# Patient Record
Sex: MALE | Race: AMERICAN INDIAN OR ALASKA NATIVE | NOT HISPANIC OR LATINO | Employment: FULL TIME | URBAN - METROPOLITAN AREA
[De-identification: names, ages, dates, MRNs, and addresses within clinical notes are randomized per-mention and may not be internally consistent; named-entity substitution may affect disease eponyms.]

---

## 2022-12-12 ENCOUNTER — HOSPITAL ENCOUNTER (OUTPATIENT)
Dept: RADIOLOGY | Facility: HOSPITAL | Age: 42
Discharge: HOME | End: 2022-12-12
Payer: COMMERCIAL

## 2022-12-12 DIAGNOSIS — Z79.01 LONG TERM (CURRENT) USE OF ANTICOAGULANTS: ICD-10-CM

## 2022-12-12 DIAGNOSIS — J32.8 OTHER CHRONIC SINUSITIS: ICD-10-CM

## 2022-12-12 DIAGNOSIS — Z01.818 OTHER SPECIFIED PRE-OPERATIVE EXAMINATION: Primary | ICD-10-CM

## 2022-12-12 DIAGNOSIS — Z01.818 OTHER SPECIFIED PRE-OPERATIVE EXAMINATION: ICD-10-CM

## 2022-12-12 PROCEDURE — 71046 X-RAY EXAM CHEST 2 VIEWS: CPT | Mod: TC

## 2022-12-21 ENCOUNTER — ANESTHESIA EVENT (OUTPATIENT)
Dept: SURGERY | Facility: HOSPITAL | Age: 42
End: 2022-12-21
Payer: COMMERCIAL

## 2022-12-21 NOTE — DISCHARGE INSTRUCTIONS
Endoscopic Sinus Surgery Discharge Instructions     About this topic   Endoscopic sinus surgery can correct problems with your sinuses when drugs do not help. The sinuses are air-filled spaces inside the head that lie behind your forehead, nose, cheeks, and eyes. The spaces have small hairs that clean the sinuses. Your sinuses may swell, get inflamed or infected if the small hairs are not working well. You can also have problems if there is a block in the opening to the sinuses. The mucus sometimes gets trapped inside the sinus, causing pain.     What care is needed at home?   Do not blow your nose or sneeze for 7 to 10 days. If you must sneeze, keep your mouth open.  Change your dressing as needed for the next few days. Once the bleeding stops then you will not need a dressing anymore.   Breathe through your mouth for the first few days.  Place an ice pack wrapped in a towel over the painful part. Never put ice right on the skin. Do not leave the ice on more than 10 to 15 minutes at a time. This will help relieve pain and swelling.  Protect your nose from injury.  Avoid activities that put pressure on your face, like bending over or holding your breath.  You can take a bath or shower but make sure it's not too hot.   Ask the doctor when you should start to use a saline rinse for your nose. This may be right after any packing has been removed. You may need to use the rinse 3 to 4 times each day to help prevent crusts from forming inside of your nose.  You can use a humidifier.  Ask the doctor when you can return to your normal activities or return to work.    What follow-up care is needed?   Keep your scheduled follow up appointment.   Your doctor may remove any packing or stents (small tubes) at one of these visits. Follow the instructions your doctor told you.    Will physical activity be limited?   Talk with your doctor about the right amount of activity for you.  Do not lift anything over 10 pounds (4.5 kg) for  2 to 4 weeks.  Do not bend over toward the floor.  Avoid activities that may jerk your head, like playing sports.    What problems could happen?   Infection  Bleeding  Numbness in the nose  Eye problems  Bruising around eyes  Cerebrospinal fluid leaks from nose    When do I need to call the doctor?   Signs of infection. These include a fever of 100.4°F (38°C) or higher, chills.  Lots of bleeding from your nose  Packing comes out before it should  Upset stomach and throwing up not helped with drugs  Too much pain or headache  Swelling or redness of the eyes or nose  Clear fluid draining from one side of your nose  Cough, shortness of breath, chest pain  You are not feeling better in 2 to 3 days or you are feeling worse     Patient Education       Septoplasty Discharge Instructions   About this topic   Septoplasty is done to correct problems with the septum inside your nose. Your septum is the wall that divides your nostrils. It is made of bone and hard tissue called cartilage. The surgery is done to:  Fix a septum that is out of place and is blocking your nose  Straighten your septum  Repair a crooked, bent, or deformed septum that blocks your ability to breathe  Repair a hole in your septum  Control sinus infections  Control irregular nose bleeding     What care is needed at home?   Avoid lying face down for the first few days after the surgery.  Sleep in a recliner or in a reclined position for the first few nights.  Breathe through your mouth for the first few days.  Place an ice pack wrapped in a towel over the painful part. Never put ice right on the skin. Do not leave the ice on more than 10 to 15 minutes at a time. This will help relieve pain and swelling.  Do not blow, touch, or rub your nose. This can cause more swelling and bleeding. Try not to sneeze.  Protect your nose from injury.  Avoid activities that put pressure on your face, like bending over or holding your breath.  Wound care: Take the packing out  tomorrow.  You can start taking a shower tomorrow.  Ask the doctor when you can return to your normal activities or return to work.  What follow-up care is needed?   Be sure to keep the follow up visit.   Will physical activity be limited?   Talk with your doctor about the right amount of activity for you.  Do not lift anything heavy for 2 to 4 weeks.   Do not stay in the sun for more than 15 minutes. This will help to avoid getting sunburn on your nose.  What problems could happen?   Infection  Bleeding  Numbness in the nose  Hole in nasal septum  Scarring in nose that that causes blockage  Changes in taste or smell  When do I need to call the doctor?   Signs of infection. These include a fever of 100.4°F (38°C) or higher, chills.  Signs of wound infection. These include swelling, redness, warmth around the wound; too much pain when touched; yellowish, greenish, or bloody discharge; foul smell coming from the cut site; cut site opens up.  Lots of bleeding  Packing comes out before it should  Upset stomach and throwing up not helped with drugs  Too much pain  Cough, shortness of breath, chest pain  Health problem is not better or you are feeling worse

## 2022-12-22 ENCOUNTER — ANESTHESIA (OUTPATIENT)
Dept: SURGERY | Facility: HOSPITAL | Age: 42
End: 2022-12-22
Payer: COMMERCIAL

## 2022-12-22 ENCOUNTER — HOSPITAL ENCOUNTER (OUTPATIENT)
Facility: HOSPITAL | Age: 42
Discharge: HOME | End: 2022-12-22
Attending: OTOLARYNGOLOGY | Admitting: OTOLARYNGOLOGY
Payer: COMMERCIAL

## 2022-12-22 DIAGNOSIS — J32.8 OTHER CHRONIC SINUSITIS: ICD-10-CM

## 2022-12-22 PROCEDURE — 25000003 PHARM REV CODE 250: Performed by: ANESTHESIOLOGY

## 2022-12-22 PROCEDURE — 36000709 HC OR TIME LEV III EA ADD 15 MIN: Performed by: OTOLARYNGOLOGY

## 2022-12-22 PROCEDURE — 37000008 HC ANESTHESIA 1ST 15 MINUTES: Performed by: OTOLARYNGOLOGY

## 2022-12-22 PROCEDURE — 63600175 PHARM REV CODE 636 W HCPCS: Performed by: OTOLARYNGOLOGY

## 2022-12-22 PROCEDURE — C9046 COCAINE HCL NASAL SOLUTION: HCPCS | Mod: TB | Performed by: OTOLARYNGOLOGY

## 2022-12-22 PROCEDURE — 71000015 HC POSTOP RECOV 1ST HR: Performed by: OTOLARYNGOLOGY

## 2022-12-22 PROCEDURE — 63600175 PHARM REV CODE 636 W HCPCS: Performed by: ANESTHESIOLOGY

## 2022-12-22 PROCEDURE — 25000003 PHARM REV CODE 250

## 2022-12-22 PROCEDURE — 71000033 HC RECOVERY, INTIAL HOUR: Performed by: OTOLARYNGOLOGY

## 2022-12-22 PROCEDURE — 25000003 PHARM REV CODE 250: Performed by: NURSE ANESTHETIST, CERTIFIED REGISTERED

## 2022-12-22 PROCEDURE — 36000708 HC OR TIME LEV III 1ST 15 MIN: Performed by: OTOLARYNGOLOGY

## 2022-12-22 PROCEDURE — 25000003 PHARM REV CODE 250: Mod: TB | Performed by: OTOLARYNGOLOGY

## 2022-12-22 PROCEDURE — 37000009 HC ANESTHESIA EA ADD 15 MINS: Performed by: OTOLARYNGOLOGY

## 2022-12-22 PROCEDURE — 71000039 HC RECOVERY, EACH ADD'L HOUR: Performed by: OTOLARYNGOLOGY

## 2022-12-22 PROCEDURE — 63600175 PHARM REV CODE 636 W HCPCS: Performed by: NURSE ANESTHETIST, CERTIFIED REGISTERED

## 2022-12-22 PROCEDURE — 27201423 OPTIME MED/SURG SUP & DEVICES STERILE SUPPLY: Performed by: OTOLARYNGOLOGY

## 2022-12-22 PROCEDURE — 71000016 HC POSTOP RECOV ADDL HR: Performed by: OTOLARYNGOLOGY

## 2022-12-22 RX ORDER — SODIUM CHLORIDE 9 MG/ML
INJECTION, SOLUTION INTRAVENOUS CONTINUOUS
Status: DISCONTINUED | OUTPATIENT
Start: 2022-12-22 | End: 2022-12-22 | Stop reason: HOSPADM

## 2022-12-22 RX ORDER — ONDANSETRON 2 MG/ML
4 INJECTION INTRAMUSCULAR; INTRAVENOUS ONCE
Status: DISCONTINUED | OUTPATIENT
Start: 2022-12-22 | End: 2022-12-22 | Stop reason: HOSPADM

## 2022-12-22 RX ORDER — CEFAZOLIN SODIUM 1 G/3ML
2 INJECTION, POWDER, FOR SOLUTION INTRAMUSCULAR; INTRAVENOUS
Status: COMPLETED | OUTPATIENT
Start: 2022-12-22 | End: 2022-12-22

## 2022-12-22 RX ORDER — COCAINE HYDROCHLORIDE 40 MG/ML
SOLUTION NASAL
Status: DISCONTINUED
Start: 2022-12-22 | End: 2022-12-22 | Stop reason: HOSPADM

## 2022-12-22 RX ORDER — SILVER NITRATE 38.21; 12.74 MG/1; MG/1
STICK TOPICAL
Status: DISCONTINUED | OUTPATIENT
Start: 2022-12-22 | End: 2022-12-22 | Stop reason: HOSPADM

## 2022-12-22 RX ORDER — LIDOCAINE HYDROCHLORIDE 10 MG/ML
INJECTION, SOLUTION EPIDURAL; INFILTRATION; INTRACAUDAL; PERINEURAL
Status: DISCONTINUED | OUTPATIENT
Start: 2022-12-22 | End: 2022-12-22

## 2022-12-22 RX ORDER — LABETALOL HYDROCHLORIDE 5 MG/ML
INJECTION, SOLUTION INTRAVENOUS
Status: DISCONTINUED
Start: 2022-12-22 | End: 2022-12-22 | Stop reason: HOSPADM

## 2022-12-22 RX ORDER — OXYMETAZOLINE HCL 0.05 %
2 SPRAY, NON-AEROSOL (ML) NASAL
Status: COMPLETED | OUTPATIENT
Start: 2022-12-22 | End: 2022-12-22

## 2022-12-22 RX ORDER — METHYLPREDNISOLONE 4 MG/1
4 TABLET ORAL
COMMUNITY
End: 2023-08-14

## 2022-12-22 RX ORDER — COCAINE HYDROCHLORIDE 40 MG/ML
SOLUTION NASAL
Status: DISCONTINUED | OUTPATIENT
Start: 2022-12-22 | End: 2022-12-22 | Stop reason: HOSPADM

## 2022-12-22 RX ORDER — LIDOCAINE HYDROCHLORIDE 10 MG/ML
INJECTION, SOLUTION EPIDURAL; INFILTRATION; INTRACAUDAL; PERINEURAL
Status: DISCONTINUED | OUTPATIENT
Start: 2022-12-22 | End: 2022-12-22 | Stop reason: HOSPADM

## 2022-12-22 RX ORDER — MIDAZOLAM HYDROCHLORIDE 1 MG/ML
2 INJECTION INTRAMUSCULAR; INTRAVENOUS ONCE AS NEEDED
Status: COMPLETED | OUTPATIENT
Start: 2022-12-22 | End: 2022-12-22

## 2022-12-22 RX ORDER — OXYMETAZOLINE HCL 0.05 %
SPRAY, NON-AEROSOL (ML) NASAL
Status: COMPLETED
Start: 2022-12-22 | End: 2022-12-22

## 2022-12-22 RX ORDER — PROPOFOL 10 MG/ML
VIAL (ML) INTRAVENOUS
Status: DISCONTINUED | OUTPATIENT
Start: 2022-12-22 | End: 2022-12-22

## 2022-12-22 RX ORDER — FENTANYL CITRATE 50 UG/ML
INJECTION, SOLUTION INTRAMUSCULAR; INTRAVENOUS
Status: DISCONTINUED | OUTPATIENT
Start: 2022-12-22 | End: 2022-12-22

## 2022-12-22 RX ORDER — EPINEPHRINE CONVENIENCE KIT 1 MG/ML(1)
KIT INTRAMUSCULAR; SUBCUTANEOUS
Status: DISCONTINUED | OUTPATIENT
Start: 2022-12-22 | End: 2022-12-22 | Stop reason: HOSPADM

## 2022-12-22 RX ORDER — METHOCARBAMOL 100 MG/ML
1000 INJECTION, SOLUTION INTRAMUSCULAR; INTRAVENOUS ONCE
Status: COMPLETED | OUTPATIENT
Start: 2022-12-22 | End: 2022-12-22

## 2022-12-22 RX ORDER — LABETALOL HYDROCHLORIDE 5 MG/ML
10 INJECTION, SOLUTION INTRAVENOUS ONCE
Status: COMPLETED | OUTPATIENT
Start: 2022-12-22 | End: 2022-12-22

## 2022-12-22 RX ORDER — ONDANSETRON HYDROCHLORIDE 2 MG/ML
INJECTION, SOLUTION INTRAMUSCULAR; INTRAVENOUS
Status: DISCONTINUED | OUTPATIENT
Start: 2022-12-22 | End: 2022-12-22

## 2022-12-22 RX ORDER — CEFDINIR 300 MG/1
300 CAPSULE ORAL 2 TIMES DAILY
COMMUNITY
End: 2023-08-15

## 2022-12-22 RX ORDER — MEPERIDINE HYDROCHLORIDE 25 MG/ML
12.5 INJECTION INTRAMUSCULAR; INTRAVENOUS; SUBCUTANEOUS ONCE
Status: DISCONTINUED | OUTPATIENT
Start: 2022-12-22 | End: 2022-12-22 | Stop reason: HOSPADM

## 2022-12-22 RX ORDER — ROCURONIUM BROMIDE 10 MG/ML
INJECTION, SOLUTION INTRAVENOUS
Status: DISCONTINUED | OUTPATIENT
Start: 2022-12-22 | End: 2022-12-22

## 2022-12-22 RX ORDER — SODIUM CHLORIDE, SODIUM GLUCONATE, SODIUM ACETATE, POTASSIUM CHLORIDE AND MAGNESIUM CHLORIDE 30; 37; 368; 526; 502 MG/100ML; MG/100ML; MG/100ML; MG/100ML; MG/100ML
INJECTION, SOLUTION INTRAVENOUS CONTINUOUS
Status: DISCONTINUED | OUTPATIENT
Start: 2022-12-22 | End: 2022-12-22 | Stop reason: HOSPADM

## 2022-12-22 RX ORDER — HYDROMORPHONE HYDROCHLORIDE 2 MG/ML
INJECTION, SOLUTION INTRAMUSCULAR; INTRAVENOUS; SUBCUTANEOUS
Status: DISCONTINUED
Start: 2022-12-22 | End: 2022-12-22 | Stop reason: HOSPADM

## 2022-12-22 RX ORDER — HYDROMORPHONE HYDROCHLORIDE 2 MG/ML
0.4 INJECTION, SOLUTION INTRAMUSCULAR; INTRAVENOUS; SUBCUTANEOUS EVERY 5 MIN PRN
Status: DISCONTINUED | OUTPATIENT
Start: 2022-12-22 | End: 2022-12-22 | Stop reason: HOSPADM

## 2022-12-22 RX ORDER — SODIUM CHLORIDE, SODIUM LACTATE, POTASSIUM CHLORIDE, CALCIUM CHLORIDE 600; 310; 30; 20 MG/100ML; MG/100ML; MG/100ML; MG/100ML
INJECTION, SOLUTION INTRAVENOUS CONTINUOUS
Status: DISCONTINUED | OUTPATIENT
Start: 2022-12-22 | End: 2022-12-22 | Stop reason: HOSPADM

## 2022-12-22 RX ORDER — SILVER NITRATE 38.21; 12.74 MG/1; MG/1
STICK TOPICAL
Status: DISCONTINUED
Start: 2022-12-22 | End: 2022-12-22 | Stop reason: HOSPADM

## 2022-12-22 RX ORDER — EPINEPHRINE 1 MG/ML
INJECTION, SOLUTION, CONCENTRATE INTRAVENOUS
Status: DISCONTINUED
Start: 2022-12-22 | End: 2022-12-22 | Stop reason: HOSPADM

## 2022-12-22 RX ORDER — DEXAMETHASONE SODIUM PHOSPHATE 4 MG/ML
INJECTION, SOLUTION INTRA-ARTICULAR; INTRALESIONAL; INTRAMUSCULAR; INTRAVENOUS; SOFT TISSUE
Status: DISCONTINUED | OUTPATIENT
Start: 2022-12-22 | End: 2022-12-22

## 2022-12-22 RX ORDER — CEFAZOLIN 2 G/1
INJECTION, POWDER, FOR SOLUTION INTRAMUSCULAR; INTRAVENOUS
Status: COMPLETED
Start: 2022-12-22 | End: 2022-12-22

## 2022-12-22 RX ADMIN — HYDROMORPHONE HYDROCHLORIDE 0.4 MG: 2 INJECTION INTRAMUSCULAR; INTRAVENOUS; SUBCUTANEOUS at 12:12

## 2022-12-22 RX ADMIN — OXYMETAZOLINE HCL: 0.05 SPRAY NASAL at 10:12

## 2022-12-22 RX ADMIN — DEXAMETHASONE SODIUM PHOSPHATE 12 MG: 4 INJECTION, SOLUTION INTRA-ARTICULAR; INTRALESIONAL; INTRAMUSCULAR; INTRAVENOUS; SOFT TISSUE at 11:12

## 2022-12-22 RX ADMIN — FENTANYL CITRATE 100 MCG: 50 INJECTION, SOLUTION INTRAMUSCULAR; INTRAVENOUS at 11:12

## 2022-12-22 RX ADMIN — CEFAZOLIN 2 G: 330 INJECTION, POWDER, FOR SOLUTION INTRAMUSCULAR; INTRAVENOUS at 11:12

## 2022-12-22 RX ADMIN — LABETALOL HYDROCHLORIDE 10 MG: 5 INJECTION, SOLUTION INTRAVENOUS at 12:12

## 2022-12-22 RX ADMIN — ROCURONIUM BROMIDE 50 MG: 50 INJECTION INTRAVENOUS at 11:12

## 2022-12-22 RX ADMIN — ONDANSETRON 4 MG: 2 INJECTION INTRAMUSCULAR; INTRAVENOUS at 11:12

## 2022-12-22 RX ADMIN — LIDOCAINE HYDROCHLORIDE 50 MG: 10 INJECTION, SOLUTION EPIDURAL; INFILTRATION; INTRACAUDAL; PERINEURAL at 11:12

## 2022-12-22 RX ADMIN — METHOCARBAMOL 1000 MG: 100 INJECTION INTRAMUSCULAR; INTRAVENOUS at 12:12

## 2022-12-22 RX ADMIN — PROPOFOL 200 MG: 10 INJECTION, EMULSION INTRAVENOUS at 11:12

## 2022-12-22 RX ADMIN — SODIUM CHLORIDE, POTASSIUM CHLORIDE, SODIUM LACTATE AND CALCIUM CHLORIDE: 600; 310; 30; 20 INJECTION, SOLUTION INTRAVENOUS at 10:12

## 2022-12-22 RX ADMIN — MIDAZOLAM HYDROCHLORIDE 2 MG: 1 INJECTION, SOLUTION INTRAMUSCULAR; INTRAVENOUS at 11:12

## 2022-12-22 RX ADMIN — SUGAMMADEX 250 MG: 100 INJECTION, SOLUTION INTRAVENOUS at 11:12

## 2022-12-22 RX ADMIN — Medication 2 SPRAY: at 10:12

## 2022-12-22 RX ADMIN — Medication: at 10:12

## 2022-12-22 NOTE — ANESTHESIA PROCEDURE NOTES
Intubation    Date/Time: 12/22/2022 11:16 AM  Performed by: Zenia Marshall CRNA  Authorized by: Yuri Montilla MD     Intubation:     Induction:  Intravenous    Intubated:  Postinduction    Mask Ventilation:  Easy mask    Attempts:  1    Attempted By:  CRNA    Method of Intubation:  Direct    Blade:  Lopez 2    Laryngeal View Grade: Grade I - full view of cords      Difficult Airway Encountered?: No      Complications:  None    Airway Device:  Oral endotracheal tube    Airway Device Size:  7.5    Style/Cuff Inflation:  Cuffed (inflated to minimal occlusive pressure)    Tube secured:  23    Secured at:  The lips    Placement Verified By:  Capnometry    Complicating Factors:  None    Findings Post-Intubation:  BS equal bilateral and atraumatic/condition of teeth unchanged

## 2022-12-22 NOTE — TRANSFER OF CARE
"Anesthesia Transfer of Care Note    Patient: Jerome Loja    Procedure(s) Performed: Procedure(s) (LRB):  FESS, USING COMPUTER-ASSISTED NAVIGATION  Intraop CT image guidance (N/A)  EXCISION, NASAL TURBINATE, SUBMUCOSAL Bilateral (Bilateral)    Patient location: PACU    Anesthesia Type: general    Transport from OR: Transported from OR on room air with adequate spontaneous ventilation    Post pain: adequate analgesia    Post assessment: no apparent anesthetic complications    Post vital signs: stable    Level of consciousness: sedated    Nausea/Vomiting: no nausea/vomiting    Complications: none    Transfer of care protocol was followed      Last vitals:   Visit Vitals  /86   Pulse 94   Temp 36.2 °C (97.2 °F)   Resp 12   Ht 5' 10" (1.778 m)   Wt 63.5 kg (139 lb 15.9 oz)   SpO2 100%   BMI 20.09 kg/m²     "

## 2022-12-22 NOTE — ANESTHESIA PREPROCEDURE EVALUATION
"                                                                                                             12/22/2022  Jerome Loja is a 42 y.o., male   Pre-operative evaluation for Procedure(s) (LRB):  FESS, USING COMPUTER-ASSISTED NAVIGATION  Intraop CT image guidance (N/A)  SEPTOPLASTY, NOSE (N/A)  EXCISION, NASAL TURBINATE, SUBMUCOSAL Bilateral (Bilateral)    /78   Pulse 66   Temp 36.8 °C (98.3 °F) (Oral)   Ht 5' 10" (1.778 m)   Wt 63.5 kg (139 lb 15.9 oz)   SpO2 97%   BMI 20.09 kg/m²     Past Medical History:   Diagnosis Date    Chronic sinusitis, unspecified        There is no problem list on file for this patient.      Review of patient's allergies indicates:  No Known Allergies    Current Outpatient Medications   Medication Instructions    cefdinir (OMNICEF) 300 mg, Oral, 2 times daily    methylPREDNISolone (MEDROL DOSEPACK) 4 mg, Oral, use as directed       History reviewed. No pertinent surgical history.    Social History     Socioeconomic History    Marital status:    Tobacco Use    Smoking status: Never    Smokeless tobacco: Never   Substance and Sexual Activity    Alcohol use: Not Currently    Drug use: Never    Sexual activity: Yes     Partners: Female       Lab Results   Component Value Date    WBC 4.7 12/12/2022    HGB 13.1 (L) 12/12/2022    HCT 39.8 (L) 12/12/2022    MCV 95.2 (H) 12/12/2022     12/12/2022          BMP  Lab Results   Component Value Date     12/12/2022    K 4.3 12/12/2022    CHLORIDE 104 12/12/2022    CO2 31 (H) 12/12/2022    GLUCOSE 84 12/12/2022    BUN 10.1 12/12/2022    CREATININE 0.89 12/12/2022    CALCIUM 9.0 12/12/2022        INR  No results for input(s): PT, INR, PROTIME, APTT in the last 72 hours.        Diagnostic Studies:      EKG:  Results for orders placed or performed in visit on 12/12/22   EKG 12-lead    Collection Time: 12/12/22 11:25 AM    Narrative    Test Reason : Z01.818,J32.8,Z79.01,    Vent. Rate : 063 BPM     Atrial " Rate : 063 BPM     P-R Int : 136 ms          QRS Dur : 112 ms      QT Int : 408 ms       P-R-T Axes : 077 097 064 degrees     QTc Int : 417 ms    Normal sinus rhythm  Right bundle branch block  Abnormal ECG  No previous ECGs available  Confirmed by Stu Heller MD ([id number]) on 12/12/2022 4:40:02 PM    Referred By:             Confirmed By:Stu Heller MD       .      Pre-op Assessment    I have reviewed the Patient Summary Reports.    I have reviewed the NPO Status.   I have reviewed the Medications.     Review of Systems  Anesthesia Hx:  No problems with previous Anesthesia  Denies Family Hx of Anesthesia complications.    Cardiovascular:  Cardiovascular Normal  No Cardiac Complaints   Pulmonary:  Pulmonary Normal No Pulmonary Complaints   Hepatic/GI:   No Current GERD Sx       Physical Exam  General: Alert and Oriented    Airway:  Mallampati: II   Mouth Opening: Normal  TM Distance: Normal  Tongue: Normal  Neck ROM: Normal ROM    Dental:  Intact, Veneers, Caps / Implants    Chest/Lungs:  Clear to auscultation, Normal Respiratory Rate    Heart:  Rate: Normal  Rhythm: Regular Rhythm        Anesthesia Plan  Type of Anesthesia, risks & benefits discussed:    Anesthesia Type: Gen ETT  Intra-op Monitoring Plan: Standard ASA Monitors  Post Op Pain Control Plan: multimodal analgesia  Induction:  IV and Inhalation  Airway Plan: Direct, Post-Induction  Informed Consent: Informed consent signed with the Patient and all parties understand the risks and agree with anesthesia plan.  All questions answered.   ASA Score: 1  Day of Surgery Review of History & Physical: H&P Update referred to the surgeon/provider.  Anesthesia Plan Notes: Discussed Anesthetic Plan w/ Pt/Family. Questions Entertained. Accepted.    Ready For Surgery From Anesthesia Perspective.     .

## 2022-12-22 NOTE — CARE UPDATE
Received patient from the OR, he is asleep, respirations full-regular-deep-clear,hob up 30 degrees, oral airway in place, chin lift not necessary at present-nurse remains at bedside with constant assessment and observation.

## 2022-12-22 NOTE — CARE UPDATE
Dr. Montilla returned page-updated on patients status, v/s. See order for labetelol and see mar for time/dosage given.

## 2022-12-22 NOTE — CARE UPDATE
Patient awakened,post,rejected oral airway, oriented/reassured him,c/o of slight pain-will medicate per anesthesia orders, respirations full-regular-deep-clear.

## 2022-12-22 NOTE — ANESTHESIA POSTPROCEDURE EVALUATION
Anesthesia Post Evaluation    Patient: Jerome Loja    Procedure(s) Performed: Procedure(s) (LRB):  FESS, USING COMPUTER-ASSISTED NAVIGATION  Intraop CT image guidance (N/A)  EXCISION, NASAL TURBINATE, SUBMUCOSAL Bilateral (Bilateral)    Final Anesthesia Type: general      Patient location during evaluation: PACU  Patient participation: Yes- Able to Participate  Level of consciousness: awake  Post-procedure vital signs: reviewed and stable  Pain management: adequate  Airway patency: patent    PONV status at discharge: vomiting (controlled) and nausea (controlled)  Anesthetic complications: no      Cardiovascular status: hemodynamically stable  Respiratory status: spontaneous ventilation and unassisted  Hydration status: euvolemic  Follow-up not needed.  Comments:              Vitals Value Taken Time   /89 12/22/22 1331   Temp 36.5 °C (97.7 °F) 12/22/22 1331   Pulse 93 12/22/22 1331   Resp 18 12/22/22 1331   SpO2 93 % 12/22/22 1331         Event Time   Out of Recovery 13:23:00         Pain/Amee Score: Pain Rating Prior to Med Admin: 8 (12/22/2022  1:15 PM)  Pain Rating Post Med Admin: 1 (12/22/2022  1:15 PM)  Amee Score: 10 (12/22/2022  1:15 PM)

## 2022-12-22 NOTE — CARE UPDATE
Dr. Montilla re-rounding and updated on his status, meds given, v/s.   See order to repeat labetelol, see mar for time/dosage given.

## 2022-12-24 VITALS
SYSTOLIC BLOOD PRESSURE: 130 MMHG | BODY MASS INDEX: 20.04 KG/M2 | TEMPERATURE: 98 F | WEIGHT: 140 LBS | HEIGHT: 70 IN | OXYGEN SATURATION: 88 % | HEART RATE: 102 BPM | RESPIRATION RATE: 18 BRPM | DIASTOLIC BLOOD PRESSURE: 87 MMHG

## 2022-12-28 LAB
ESTROGEN SERPL-MCNC: NORMAL PG/ML
INSULIN SERPL-ACNC: NORMAL U[IU]/ML
LAB AP CLINICAL INFORMATION: NORMAL
LAB AP GROSS DESCRIPTION: NORMAL
LAB AP REPORT FOOTNOTES: NORMAL
T3RU NFR SERPL: NORMAL %

## 2023-01-13 NOTE — OP NOTE
[facility]                         [address] [place] [address]    PATIENT NAME:      CRISTÓBAL MENJIVAR   YOB: 1980  CSN:               [id number]  MRN:               [id number]  ADMIT DATE:        12/22/2022 09:23:00  PHYSICIAN:         Carter March                          OPERATIVE REPORT      DATE OF SURGERY:    12/22/2022 00:00:00    SURGEON:  Carter March    PREOPERATIVE DIAGNOSES:    1. Chronic recurrent pansinusitis.  2. Intranasal and intrasinus polyposis.    POSTOPERATIVE DIAGNOSES:    1. Chronic recurrent pansinusitis.  2. Intranasal and intrasinus polyposis.    OPERATIONS PERFORMED:    1. Functional endoscopic sinus surgery.  2. Bilateral anterior and posterior ethmoidectomy.  3. Maxillary antrostomy.  4. Septoplasty.  5. Turbinoplasty.  6. Frontal sinus exploration with sphenoidectomy.    DESCRIPTION OF PROCEDURE:  With proper consent information, the patient was   brought to the operating room, placed on the operating room table in supine   position.  After satisfactory endotracheal intubation and general anesthesia,   the patient was placed asleep.  The septoplasty was done first.  A left   hemitransfixion incision was made.  The mucoperichondrium was elevated off both   sides of the septum.  This was brought back to the perpendicular plate of the   ethmoid.  The deviation was removed leaving a caudal and dorsal strut of   approximately 2 cm.  This was reapproximated with 4-0 plain catgut and a 5-0   chromic.    At that point, the nasal septum was straight.  The infundibulotomy on the right   side was done.  The anterior sinus was entered and there was a large amount of   chronically infected and inspissated material.  This was removed up to the   posterior ethmoid area.  The ground lamella was opened widely.  There was some   chronically infected and inspissated polypoid changes in this area,  and this was   opened widely up to the fovea ethmoidalis.  The anterior wall of the sphenoid   sinus was opened and there were some chronically infected material in this area   as well.  The frontoethmoidal recess was opened with upbiting cutting forceps.    This area was opened widely.  There were some chronic obstructive changes in   this area.  Chronic inflammatory tissue was removed.    An identical procedure was carried out on the opposite side.  The patient   tolerated the procedure very well.  Submucosal resection of inferior turbinate   was done.  The nose was packed with Nasopore packing.        ______________________________  Carter LUCERO/KATIA  DD:  01/13/2023  Time:  10:51AM  DT:  01/13/2023  Time:  01:46PM  Job #:  [id number]      OPERATIVE REPORT

## 2023-03-15 NOTE — OP NOTE
PATIENT NAME:      CRISTÓBAL MENJIVAR   YOB: 1980  CSN:               [id number]  MRN:               [id number]  ADMIT DATE:        12/22/2022 00:00:00  PHYSICIAN:         Carter March                          OPERATIVE REPORT      DATE OF SURGERY:    12/22/2022 00:00:00    SURGEON:  Carter March    ADDENDUM:  Maxillary antrostomy and turbinoplasty.  The maxillary antrostomy was   done in the usual fashion with a backbiting forceps.  There was an extensive   amount of mucosal antral disease removal on this area.  Identical procedure was   carried out on opposite side and also submucosal resection inferior turbinate.    Inferior turbinate submucosally was dissected.  The redundant hypertrophic   tissue was removed to the point where he had good maxillary sinus opening.        ______________________________  Carter LUCERO/AQS  DD:  03/15/2023  Time:  09:29AM  DT:  03/15/2023  Time:  09:48AM  Job #:  [id number]      OPERATIVE REPORT

## 2023-06-26 ENCOUNTER — OFFICE VISIT (OUTPATIENT)
Dept: SURGERY | Facility: CLINIC | Age: 43
End: 2023-06-26
Payer: COMMERCIAL

## 2023-06-26 VITALS — BODY MASS INDEX: 20.28 KG/M2 | HEIGHT: 70 IN | WEIGHT: 141.69 LBS

## 2023-06-26 DIAGNOSIS — R10.31 RIGHT LOWER QUADRANT PAIN: Primary | ICD-10-CM

## 2023-06-26 DIAGNOSIS — Z80.0 FAMILY HISTORY OF COLON CANCER: ICD-10-CM

## 2023-06-26 PROCEDURE — 99204 PR OFFICE/OUTPT VISIT, NEW, LEVL IV, 45-59 MIN: ICD-10-PCS | Mod: S$GLB,,, | Performed by: SURGERY

## 2023-06-26 PROCEDURE — 99204 OFFICE O/P NEW MOD 45 MIN: CPT | Mod: S$GLB,,, | Performed by: SURGERY

## 2023-06-26 RX ORDER — FEXOFENADINE HYDROCHLORIDE 180 MG/1
180 TABLET, FILM COATED ORAL EVERY MORNING
COMMUNITY
Start: 2023-04-10

## 2023-06-26 RX ORDER — SOD SULF/POT CHLORIDE/MAG SULF 1.479 G
12 TABLET ORAL DAILY
Qty: 24 TABLET | Refills: 0 | Status: CANCELLED | OUTPATIENT
Start: 2023-06-26

## 2023-06-26 RX ORDER — DIPHENHYDRAMINE HCL 25 MG
25 TABLET ORAL NIGHTLY PRN
COMMUNITY
End: 2023-08-14

## 2023-06-26 NOTE — PROGRESS NOTES
Subjective:       Patient ID: Jerome Loja is a 42 y.o. male.    Chief Complaint: Abdominal Pain (Abd pain to R side. Pt states he went to Urgent care then was sent to ER. Pt was found to have a colonic mass stating area is  to touch. Pt denies rectal pain, BRB w/ BM, or recent weigh loss. Pt states his BM have been runny.)      This is a 42-year-old male who for the last few weeks has been having right lower quadrant abdominal pain, patient presented to the emergency room for evaluation.  Patient on CT scan was found have a 2 cm x 2 cm mass at his ileocecal valve.  Unknown etiology.  Patient does have a strong family history of colon cancer with his grandmother and father both being diagnosed in her 70s.  The patient states he has not had any change in bowel habits, no diarrhea or constipation, no blood in his stools, has never had a colonoscopy in the past.    Review of Systems   Constitutional:  Negative for chills, fatigue and fever.   HENT:  Negative for nasal congestion and rhinorrhea.    Respiratory:  Negative for shortness of breath and wheezing.    Cardiovascular:  Negative for chest pain and palpitations.   Gastrointestinal:  Negative for abdominal pain, blood in stool, diarrhea, nausea and vomiting.   Endocrine: Negative for cold intolerance and heat intolerance.   Genitourinary:  Negative for difficulty urinating.   Musculoskeletal:  Negative for joint swelling and myalgias.   Integumentary:  Negative for rash and wound.   Neurological:  Negative for weakness, light-headedness and numbness.   Psychiatric/Behavioral:  Negative for agitation and confusion.        Objective:      Physical Exam  Vitals reviewed.   Constitutional:       Appearance: He is well-developed.   HENT:      Head: Normocephalic and atraumatic.   Eyes:      Conjunctiva/sclera: Conjunctivae normal.   Neck:      Trachea: Trachea normal.   Cardiovascular:      Rate and Rhythm: Normal rate and regular rhythm.   Pulmonary:       Effort: Pulmonary effort is normal.      Breath sounds: Normal breath sounds.   Abdominal:      General: There is no distension.      Palpations: Abdomen is soft.      Tenderness: There is no abdominal tenderness. There is no guarding.      Hernia: No hernia is present.   Musculoskeletal:         General: Normal range of motion.      Cervical back: Normal range of motion.   Skin:     General: Skin is warm and dry.   Neurological:      Mental Status: He is alert and oriented to person, place, and time.   Psychiatric:         Speech: Speech normal.         Behavior: Behavior normal.       Assessment:       Problem List Items Addressed This Visit    None  Visit Diagnoses       Right lower quadrant pain    -  Primary    Family history of colon cancer                Plan:       42-year-old male with right lower quadrant abdominal pain, a mass found near is ileocecal valve, strong family history of colon cancer.  CT scan was reviewed and interpreted by myself, and agree that there is a suspicious looking 2 cm mass in the ileocecal valve.  The patient will be scheduled for a diagnostic colonoscopy, after colonoscopy we will have further recommendations whether surgery will be necessary.

## 2023-06-28 ENCOUNTER — OUTSIDE PLACE OF SERVICE (OUTPATIENT)
Dept: SURGERY | Facility: CLINIC | Age: 43
End: 2023-06-28
Payer: COMMERCIAL

## 2023-06-28 DIAGNOSIS — R19.09 MASS OF ILEUM: Primary | ICD-10-CM

## 2023-06-28 PROCEDURE — 45385 COLONOSCOPY W/LESION REMOVAL: CPT | Mod: ,,, | Performed by: SURGERY

## 2023-06-28 PROCEDURE — 45385 PR COLONOSCOPY,REMV LESN,SNARE: ICD-10-PCS | Mod: ,,, | Performed by: SURGERY

## 2023-06-29 ENCOUNTER — OUTSIDE PLACE OF SERVICE (OUTPATIENT)
Dept: INTERVENTIONAL RADIOLOGY/VASCULAR | Facility: CLINIC | Age: 43
End: 2023-06-29
Payer: COMMERCIAL

## 2023-06-29 PROCEDURE — 74240 PR XRAY, UPPER GI TRACT, W/ SCOUT ABD RADIOGRAPH/IMG, W/SNGL CONTRAST: ICD-10-PCS | Mod: 26,,, | Performed by: RADIOLOGY

## 2023-06-29 PROCEDURE — 74240 X-RAY XM UPR GI TRC 1CNTRST: CPT | Mod: 26,,, | Performed by: RADIOLOGY

## 2023-07-05 ENCOUNTER — OFFICE VISIT (OUTPATIENT)
Dept: SURGERY | Facility: CLINIC | Age: 43
End: 2023-07-05
Payer: COMMERCIAL

## 2023-07-05 DIAGNOSIS — R19.09 MASS OF ILEUM: Primary | ICD-10-CM

## 2023-07-05 DIAGNOSIS — R10.31 RIGHT LOWER QUADRANT ABDOMINAL PAIN: ICD-10-CM

## 2023-07-05 DIAGNOSIS — R10.31 RIGHT LOWER QUADRANT PAIN: ICD-10-CM

## 2023-07-05 PROCEDURE — 99213 PR OFFICE/OUTPT VISIT, EST, LEVL III, 20-29 MIN: ICD-10-PCS | Mod: S$GLB,,, | Performed by: SURGERY

## 2023-07-05 PROCEDURE — 99213 OFFICE O/P EST LOW 20 MIN: CPT | Mod: S$GLB,,, | Performed by: SURGERY

## 2023-07-05 NOTE — PROGRESS NOTES
Subjective:       Patient ID: Jerome Loja is a 42 y.o. male.    Chief Complaint: Results (1wk f/u s/p colonoscopy and sm bowel series.)      Patient's right lower quadrant abdominal pain is significantly improved.  On patient's colonoscopy no ileocecal mass was able to be identified, an upper GI with small-bowel follow-through was obtained at that time.  Patient is here to discuss results and next steps.    Review of Systems   Constitutional:  Negative for chills, fatigue and fever.   HENT:  Negative for nasal congestion and rhinorrhea.    Respiratory:  Negative for shortness of breath and wheezing.    Cardiovascular:  Negative for chest pain and palpitations.   Gastrointestinal:  Negative for abdominal pain, blood in stool, diarrhea, nausea and vomiting.   Endocrine: Negative for cold intolerance and heat intolerance.   Genitourinary:  Negative for difficulty urinating.   Musculoskeletal:  Negative for joint swelling and myalgias.   Integumentary:  Negative for rash and wound.   Neurological:  Negative for weakness, light-headedness and numbness.   Psychiatric/Behavioral:  Negative for agitation and confusion.        Objective:      Physical Exam  Vitals reviewed.   Constitutional:       Appearance: He is well-developed.   HENT:      Head: Normocephalic and atraumatic.   Eyes:      Conjunctiva/sclera: Conjunctivae normal.   Neck:      Trachea: Trachea normal.   Cardiovascular:      Rate and Rhythm: Normal rate and regular rhythm.   Pulmonary:      Effort: Pulmonary effort is normal.      Breath sounds: Normal breath sounds.   Abdominal:      General: There is no distension.      Palpations: Abdomen is soft.      Tenderness: There is no abdominal tenderness. There is no guarding.      Hernia: No hernia is present.   Musculoskeletal:         General: Normal range of motion.      Cervical back: Normal range of motion.   Skin:     General: Skin is warm and dry.   Neurological:      Mental Status: He is alert and  oriented to person, place, and time.   Psychiatric:         Speech: Speech normal.         Behavior: Behavior normal.       Assessment:       Problem List Items Addressed This Visit    None  Visit Diagnoses       Mass of ileum    -  Primary    Right lower quadrant pain                Plan:       The 2 cm mass that was identified on CT scan has not been able to be visualized on colonoscopy or on small-bowel follow-through.  The patient be sent to the Gastroenterology team for possible capsule endoscopy and a repeat CT scan with p.o. and IV contrast will be ordered for 1 week from now should allow for the remaining barium to be gone from the patient.  Patient will follow up with me after seeing the Gastroenterology team.

## 2023-07-06 NOTE — PROGRESS NOTES
Orders for CT a/p with IV/PO contrast faxed to  fx [phone]. GI referral for capsule endoscopy sent internally to Dr. Vang's office.

## 2023-07-07 LAB
ANION GAP SERPL CALC-SCNC: 4 MMOL/L (ref 3–11)
BUN SERPL-MCNC: 9 MG/DL (ref 7–18)
BUN/CREAT SERPL: 11.11 RATIO (ref 7–18)
CALCIUM SERPL-MCNC: 8.7 MG/DL (ref 8.8–10.5)
CHLORIDE SERPL-SCNC: 105 MMOL/L (ref 100–108)
CO2 SERPL-SCNC: 30 MMOL/L (ref 21–32)
CREAT SERPL-MCNC: 0.81 MG/DL (ref 0.7–1.3)
GFR ESTIMATION: > 60
GLUCOSE SERPL-MCNC: 100 MG/DL (ref 70–110)
POTASSIUM SERPL-SCNC: 4.3 MMOL/L (ref 3.6–5.2)
SODIUM BLD-SCNC: 139 MMOL/L (ref 135–145)

## 2023-07-11 ENCOUNTER — TELEPHONE (OUTPATIENT)
Dept: SURGERY | Facility: CLINIC | Age: 43
End: 2023-07-11
Payer: COMMERCIAL

## 2023-07-11 DIAGNOSIS — R10.11 RIGHT UPPER QUADRANT ABDOMINAL PAIN: Primary | ICD-10-CM

## 2023-07-11 NOTE — TELEPHONE ENCOUNTER
Per Dr. Greenwood after reviewing CT results: order RUQ u/s to rule out cholecystitis. VORB. Pt notified and verbalized understanding. U/s orders faxed to  fx [phone]. Fax successfully went through.

## 2023-07-13 ENCOUNTER — TELEPHONE (OUTPATIENT)
Dept: GASTROENTEROLOGY | Facility: CLINIC | Age: 43
End: 2023-07-13
Payer: COMMERCIAL

## 2023-07-13 NOTE — TELEPHONE ENCOUNTER
Reached out to pt to [facility] colon per RMM. No answer. LVM for pt to contact our office - VL

## 2023-07-26 NOTE — PROGRESS NOTES
Called pt to schedule f/u visit to go over abd u/s results, but was unable to reach pt. LM for him to call back to schedule f/u appt.

## 2023-07-31 ENCOUNTER — TELEPHONE (OUTPATIENT)
Dept: GASTROENTEROLOGY | Facility: CLINIC | Age: 43
End: 2023-07-31
Payer: COMMERCIAL

## 2023-07-31 NOTE — TELEPHONE ENCOUNTER
----- Message from Divya Randall sent at 7/31/2023  1:14 PM CDT -----  Patient is calling in regards to would like to establish care..please call patient back at [phone]

## 2023-08-15 ENCOUNTER — TELEPHONE (OUTPATIENT)
Dept: GASTROENTEROLOGY | Facility: CLINIC | Age: 43
End: 2023-08-15

## 2023-08-15 ENCOUNTER — OFFICE VISIT (OUTPATIENT)
Dept: GASTROENTEROLOGY | Facility: CLINIC | Age: 43
End: 2023-08-15
Payer: COMMERCIAL

## 2023-08-15 VITALS
HEART RATE: 77 BPM | HEIGHT: 70 IN | DIASTOLIC BLOOD PRESSURE: 78 MMHG | WEIGHT: 145.19 LBS | SYSTOLIC BLOOD PRESSURE: 125 MMHG | OXYGEN SATURATION: 98 % | BODY MASS INDEX: 20.79 KG/M2

## 2023-08-15 DIAGNOSIS — R93.3 ABNORMAL FINDING ON GI TRACT IMAGING: Primary | ICD-10-CM

## 2023-08-15 DIAGNOSIS — R10.31 RLQ ABDOMINAL PAIN: ICD-10-CM

## 2023-08-15 DIAGNOSIS — R19.09 MASS OF ILEUM: ICD-10-CM

## 2023-08-15 DIAGNOSIS — R19.7 DIARRHEA, UNSPECIFIED TYPE: ICD-10-CM

## 2023-08-15 DIAGNOSIS — Z86.010 HISTORY OF COLON POLYPS: ICD-10-CM

## 2023-08-15 DIAGNOSIS — Z80.0 FAMILY HISTORY OF COLON CANCER IN FATHER: ICD-10-CM

## 2023-08-15 DIAGNOSIS — K80.20 CALCULUS OF GALLBLADDER WITHOUT CHOLECYSTITIS WITHOUT OBSTRUCTION: ICD-10-CM

## 2023-08-15 DIAGNOSIS — R10.31 RIGHT LOWER QUADRANT PAIN: ICD-10-CM

## 2023-08-15 DIAGNOSIS — R14.0 BLOATING: ICD-10-CM

## 2023-08-15 PROCEDURE — 99205 PR OFFICE/OUTPT VISIT, NEW, LEVL V, 60-74 MIN: ICD-10-PCS | Mod: S$GLB,,, | Performed by: INTERNAL MEDICINE

## 2023-08-15 PROCEDURE — 99205 OFFICE O/P NEW HI 60 MIN: CPT | Mod: S$GLB,,, | Performed by: INTERNAL MEDICINE

## 2023-08-15 RX ORDER — SOD SULF/POT CHLORIDE/MAG SULF 1.479 G
12 TABLET ORAL DAILY
Qty: 24 TABLET | Refills: 0 | Status: SHIPPED | OUTPATIENT
Start: 2023-08-15

## 2023-08-15 RX ORDER — DIPHENHYDRAMINE HCL 25 MG
25 TABLET ORAL NIGHTLY PRN
COMMUNITY

## 2023-08-15 NOTE — LETTER
August 16, 2023      [facility]  [address] [address]  Phone: [phone]  Fax: [phone]       Patient: Jerome Loja   YOB: 1980  Date of Visit: 5    To Whom It May Concern:    Brittany Loja  was at [facility] on 8/15/2023. The patient may return to work/school on 8/16/2023 with no restrictions. If you have any questions or concerns, or if I can be of further assistance, please do not hesitate to contact me.    Sincerely,    Dorota Mckee LPN

## 2023-08-15 NOTE — PATIENT INSTRUCTIONS
Complete stool tests.  Schedule colonoscopy.    Please notify my office if you have not been contacted within two weeks after any procedures, submitting any samples (biopsies, blood, stool, urine, etc.) or after any imaging (X-ray, CT, MRI, etc.).

## 2023-08-15 NOTE — LETTER
August 15, 2023        LORE Potts, FNP-C  [address] [address]             [facility]  [address] [address]  Phone: [phone]  Fax: [phone]   Patient: Jerome Loja   MR Number: [id number]   YOB: 1980   Date of Visit: 8/15/2023       Dear Dr. Potts:    Thank you for referring Jerome Loja to me for evaluation. Below are the relevant portions of my assessment and plan of care.            If you have questions, please do not hesitate to call me. I look forward to following Jerome along with you.    Sincerely,      Anitha Carter MD           CC  No Recipients

## 2023-08-16 NOTE — TELEPHONE ENCOUNTER
----- Message from Saadia Adams sent at 8/16/2023  8:28 AM CDT -----  Contact: Patient  Patient called to consult with nurse or staff regarding a providers excuse he is needing for work. He is needing one for the appointment that was on yesterday and would like it sent to his email if possible. He would like a call back and can be reached at [phone]. Thanks/MR

## 2023-08-23 ENCOUNTER — TELEPHONE (OUTPATIENT)
Dept: GASTROENTEROLOGY | Facility: CLINIC | Age: 43
End: 2023-08-23
Payer: COMMERCIAL

## 2023-08-23 DIAGNOSIS — R93.3 ABNORMAL FINDING ON GI TRACT IMAGING: Primary | ICD-10-CM

## 2023-08-23 DIAGNOSIS — R19.7 DIARRHEA, UNSPECIFIED TYPE: ICD-10-CM

## 2023-08-23 DIAGNOSIS — R10.31 RLQ ABDOMINAL PAIN: ICD-10-CM

## 2023-08-23 NOTE — PROGRESS NOTES
"[facility]  [address] [place] [address]  Phone: [phone]  Fax: [phone]    History & Physical         Provider: Dr. Anitha Carter    Patient Name: Jerome PICHARDO (age):1980  43 y.o.           Gender: male   Phone: [phone]     Referring Physician: LORE Potts     Vital Signs:   Height - 5'10"  Weight - 145 lb   BMI -  20.83    Plan: Colonoscopy    Encounter Diagnoses   Name Primary?    Abnormal finding on GI tract imaging Yes    RLQ abdominal pain     Diarrhea, unspecified type            History:      Past Medical History:   Diagnosis Date    Chronic sinusitis, unspecified     Personal history of colonic polyps       Past Surgical History:   Procedure Laterality Date    COLONOSCOPY  2023    EXCISION, NASAL TURBINATE, SUBMUCOSAL Bilateral 2022    Procedure: EXCISION, NASAL TURBINATE, SUBMUCOSAL Bilateral;  Surgeon: Carter March MD;  Location: [facility] OR;  Service: ENT;  Laterality: Bilateral;    FUNCTIONAL ENDOSCOPIC SINUS SURGERY (FESS) N/A 2022    Procedure: FESS, USING COMPUTER-ASSISTED NAVIGATION  Intraop CT image guidance;  Surgeon: Carter March MD;  Location: [facility] OR;  Service: ENT;  Laterality: N/A;  did not use lucretia      Medication List with Changes/Refills   Current Medications    ALLERGY RELIEF, FEXOFENADINE, 180 MG TABLET    Take 180 mg by mouth every morning.    DIPHENHYDRAMINE (SOMINEX) 25 MG TABLET    Take 25 mg by mouth nightly as needed for Insomnia.    SOD SULF-POT CHLORIDE-MAG SULF (SUTAB) 1.479-0.188- 0.225 GRAM TABLET    Take 12 tablets by mouth once daily. Take according to package instructions with indicated amount of water. No breakfast day before test. May substitute with Suprep, Clenpiq, Plenvu, Moviprep or GoLytely based on Rx plan and patient preference.      Review of patient's allergies indicates:  No Known Allergies "   Family History   Problem Relation Age of Onset    Diabetes Mother     Hypertension Mother     Diabetes Father     Hypertension Father     Colon cancer Father 75        passe    Stomach cancer Father     Colon cancer Paternal Grandmother 78      Social History     Tobacco Use    Smoking status: Never    Smokeless tobacco: Never   Substance Use Topics    Alcohol use: Not Currently    Drug use: Never        Physical Examination:     General Appearance:___________________________  HEENT: _____________________________________  Abdomen:____________________________________  Heart:________________________________________  Lungs:_______________________________________  Extremities:___________________________________  Skin:_________________________________________  Endocrine:____________________________________  Genitourinary:_________________________________  Neurological:__________________________________      Patient has been evaluated immediately prior to sedation and is medically cleared for endoscopy with IVCS as an ASA class: ______      Physician Signature: _________________________       Date: ________  Time: ________

## 2023-08-23 NOTE — TELEPHONE ENCOUNTER
----- Message from Dominique Zhou MA sent at 8/23/2023  1:01 PM CDT -----  Contact: Pt    ----- Message -----  From: Shailesh Hogan MA  Sent: 8/23/2023  10:08 AM CDT  To: Dominique Zohu MA      ----- Message -----  From: Marito Spence  Sent: 8/23/2023  10:05 AM CDT  To: [facility] Procedure Scheduling    Pt is calling rg his procedure and states that he was informed it on was on this coming Monday / pt states that he was informed it would be changed from Oct to 08/28 and can be reached at [phone]/thanks/dbw

## 2023-08-28 ENCOUNTER — TELEPHONE (OUTPATIENT)
Dept: GASTROENTEROLOGY | Facility: CLINIC | Age: 43
End: 2023-08-28

## 2023-08-28 ENCOUNTER — OUTSIDE PLACE OF SERVICE (OUTPATIENT)
Dept: GASTROENTEROLOGY | Facility: CLINIC | Age: 43
End: 2023-08-28

## 2023-08-28 LAB — CRC RECOMMENDATION EXT: NORMAL

## 2023-08-28 PROCEDURE — 45385 PR COLONOSCOPY,REMV LESN,SNARE: ICD-10-PCS | Mod: ,,, | Performed by: INTERNAL MEDICINE

## 2023-08-28 PROCEDURE — 45385 COLONOSCOPY W/LESION REMOVAL: CPT | Mod: ,,, | Performed by: INTERNAL MEDICINE

## 2023-08-28 NOTE — TELEPHONE ENCOUNTER
Returned pt call and told him to stop by the clinic after his procedure to  his work excuse. I would have it at the front for him. Pt acknowledge he understood. cecile

## 2023-08-28 NOTE — TELEPHONE ENCOUNTER
----- Message from Darya Palacios sent at 8/28/2023  9:41 AM CDT -----  Contact: self  Patient is calling in reference to get a work excuse for colonoscopy he had done this morning. Would like to pick it up from the office. Please call back at [phone]

## 2023-08-30 ENCOUNTER — TELEPHONE (OUTPATIENT)
Dept: GASTROENTEROLOGY | Facility: CLINIC | Age: 43
End: 2023-08-30
Payer: COMMERCIAL

## 2023-08-30 NOTE — TELEPHONE ENCOUNTER
1 TA, repeat colonoscopy in 3 years.     Reviewed with patient directly.  His loose stool are intermittent, nearly life long.  Stable. He requests lab orders.    Dorota, update in Health Maintenance section of Epic. Send the patient his stool orders via the patient portal in Epic (he does not believe he has them).  KIMBERLYN

## 2023-08-31 ENCOUNTER — PATIENT MESSAGE (OUTPATIENT)
Dept: GASTROENTEROLOGY | Facility: CLINIC | Age: 43
End: 2023-08-31
Payer: COMMERCIAL

## 2023-10-02 ENCOUNTER — DOCUMENTATION ONLY (OUTPATIENT)
Dept: GASTROENTEROLOGY | Facility: CLINIC | Age: 43
End: 2023-10-02
Payer: COMMERCIAL

## 2023-10-06 ENCOUNTER — TELEPHONE (OUTPATIENT)
Dept: SURGERY | Facility: CLINIC | Age: 43
End: 2023-10-06
Payer: COMMERCIAL

## 2023-10-06 NOTE — TELEPHONE ENCOUNTER
Have called several times and LM since receiving US report re: gallstones to see if he wanted to talk to Dr. Greenwood about having gallbladder removed. Have not been able to reach pt any time he has been called. US report sent to be scanned into pt's chart.

## 2024-08-20 ENCOUNTER — OFFICE VISIT (OUTPATIENT)
Dept: GASTROENTEROLOGY | Facility: CLINIC | Age: 44
End: 2024-08-20
Payer: COMMERCIAL

## 2024-08-20 ENCOUNTER — TELEPHONE (OUTPATIENT)
Dept: GASTROENTEROLOGY | Facility: CLINIC | Age: 44
End: 2024-08-20

## 2024-08-20 VITALS
HEIGHT: 70 IN | HEART RATE: 83 BPM | SYSTOLIC BLOOD PRESSURE: 106 MMHG | WEIGHT: 141.38 LBS | OXYGEN SATURATION: 97 % | BODY MASS INDEX: 20.24 KG/M2 | DIASTOLIC BLOOD PRESSURE: 69 MMHG | RESPIRATION RATE: 16 BRPM

## 2024-08-20 DIAGNOSIS — R10.9 ABDOMINAL CRAMPING: ICD-10-CM

## 2024-08-20 DIAGNOSIS — Z80.0 FAMILY HISTORY OF COLON CANCER IN FATHER: ICD-10-CM

## 2024-08-20 DIAGNOSIS — R10.13 ABDOMINAL PAIN, EPIGASTRIC: ICD-10-CM

## 2024-08-20 DIAGNOSIS — R10.11 RIGHT UPPER QUADRANT PAIN: ICD-10-CM

## 2024-08-20 DIAGNOSIS — R19.7 DIARRHEA, UNSPECIFIED TYPE: Primary | ICD-10-CM

## 2024-08-20 DIAGNOSIS — R14.0 BLOATING: ICD-10-CM

## 2024-08-20 DIAGNOSIS — Z86.010 HISTORY OF COLON POLYPS: ICD-10-CM

## 2024-08-20 DIAGNOSIS — K80.20 CALCULUS OF GALLBLADDER WITHOUT CHOLECYSTITIS WITHOUT OBSTRUCTION: ICD-10-CM

## 2024-08-20 PROCEDURE — 99214 OFFICE O/P EST MOD 30 MIN: CPT | Mod: S$GLB,,, | Performed by: INTERNAL MEDICINE

## 2024-08-20 NOTE — PATIENT INSTRUCTIONS
Complete blood and stool tests.     Please notify my office if you have not been contacted within two weeks after any procedures, submitting any samples (biopsies, blood, stool, urine, etc.) or after any imaging (X-ray, CT, MRI, etc.).

## 2024-08-20 NOTE — LETTER
August 20, 2024        LORE Potts, FNP-C  [address] [facility] [address]             [facility]  [address] [address]  Phone: [phone]  Fax: [phone]   Patient: Jerome Loja   MR Number: [id number]   YOB: 1980   Date of Visit: 8/20/2024       Dear Dr. Potts:    Thank you for referring Jerome Loja to me for evaluation. Attached you will find relevant portions of my assessment and plan of care.    If you have questions, please do not hesitate to call me. I look forward to following Jerome Loja along with you.    Sincerely,      Anitha Catrer MD            CC  No Recipients    Enclosure

## 2024-08-20 NOTE — PROGRESS NOTES
Clinic Note    Reason for visit:  The primary encounter diagnosis was Diarrhea, unspecified type. Diagnoses of Calculus of gallbladder without cholecystitis without obstruction, Bloating, Right upper quadrant pain, Abdominal pain, epigastric, Abdominal cramping, History of colon polyps, and Family history of colon cancer in father were also pertinent to this visit.    PCP: LORE Potts       HPI:  This is a 43 y.o. male who is here for a follow up. Patient with chronic loose stools. He has gallstones but decided against surgery. He has had about 3-4 episodes of abdominal cramping since 8/2023. The cramping comes on quickly and may last a day or 2. Usually has daily BMs, more loose. No blood in stool. During the episodes of cramping, he has his normal BMs. He will also have bloating during that time. The cramping is upper abdomen. Not necessarily after eating. Seems to be lactose intolerant and mostly avoids dairy. No prior EGD. No reflux/dysphagia. He had allergy testing years ago prior to sinus surgery. Weight stable. Denies NSAID use. No tobacco/alcohol/drug use.    2022 Hb 13.1, MCV 95.2H, CBC onl    Colonoscopy 8/28/2023: 1 TA, repeat colonoscopy in 3 years.    7/2/2023 ABD US: cholelithiasis w/o evidence of cholecystitis. Normal liver, CBD, patent PV.     7/7/2023 CT A/P W: fatty liver, gallbladder wall thickening with mild pericholecystic fluid; normal bowels      7/5/2023 UGIS-SBFT: unremarkable     Colonoscopy with Dr. Greenwood 6/28/2023 - IC valve was identified but unable to cannulate after attempts; polyp removed from 80cm - path? He was told it was benign      6/20/2023 CT A/P W/: hyperdense or enhancing mass-like lesion at the IC valve measuring 2.8 x 2.1cm; 4mm LLL nodule, subcentimeter cysts scattered in liver, no mention of GB?    Review of Systems   Constitutional:  Negative for diaphoresis, fatigue, fever and unexpected weight change.   HENT:  Negative for hearing loss, mouth sores, postnasal  drip, sore throat and trouble swallowing.    Eyes:  Negative for pain, discharge and eye dryness.   Respiratory:  Negative for apnea, cough, choking, chest tightness, shortness of breath and wheezing.    Cardiovascular:  Negative for chest pain, palpitations and leg swelling.   Gastrointestinal:  Negative for abdominal distention, abdominal pain, anal bleeding, blood in stool, change in bowel habit, constipation, diarrhea, nausea, rectal pain, vomiting, reflux and fecal incontinence.   Genitourinary:  Negative for bladder incontinence, dysuria and hematuria.   Musculoskeletal:  Negative for arthralgias, back pain and joint swelling.   Integumentary:  Negative for color change and rash.   Allergic/Immunologic: Negative for environmental allergies and food allergies.   Neurological:  Negative for seizures and headaches.   Hematological:  Negative for adenopathy. Does not bruise/bleed easily.        Past Medical History:   Diagnosis Date    Chronic sinusitis, unspecified     Personal history of colonic polyps      Past Surgical History:   Procedure Laterality Date    COLONOSCOPY  06/28/2023    EXCISION, NASAL TURBINATE, SUBMUCOSAL Bilateral 12/22/2022    Procedure: EXCISION, NASAL TURBINATE, SUBMUCOSAL Bilateral;  Surgeon: Carter March MD;  Location: [facility];  Service: ENT;  Laterality: Bilateral;    FUNCTIONAL ENDOSCOPIC SINUS SURGERY (FESS) N/A 12/22/2022    Procedure: FESS, USING COMPUTER-ASSISTED NAVIGATION  Intraop CT image guidance;  Surgeon: Carter March MD;  Location: [facility];  Service: ENT;  Laterality: N/A;  did not use lucretia     Family History   Problem Relation Name Age of Onset    Diabetes Mother      Hypertension Mother      Diabetes Father      Hypertension Father      Colon cancer Father  75        passe    Stomach cancer Father      Colon cancer Paternal Grandmother  78     Social History     Tobacco Use    Smoking status: Never    Smokeless tobacco: Never   Substance Use Topics    Alcohol  "use: Not Currently    Drug use: Never     Review of patient's allergies indicates:  No Known Allergies     Medication List with Changes/Refills   Discontinued Medications    ALLERGY RELIEF, FEXOFENADINE, 180 MG TABLET    Take 180 mg by mouth every morning.    DIPHENHYDRAMINE (SOMINEX) 25 MG TABLET    Take 25 mg by mouth nightly as needed for Insomnia.    SOD SULF-POT CHLORIDE-MAG SULF (SUTAB) 1.479-0.188- 0.225 GRAM TABLET    Take 12 tablets by mouth once daily. Take according to package instructions with indicated amount of water. No breakfast day before test. May substitute with Suprep, Clenpiq, Plenvu, Moviprep or GoLytely based on Rx plan and patient preference.         Vital Signs:  /69 (BP Location: Left arm, Patient Position: Sitting, BP Method: Medium (Automatic))   Pulse 83   Resp 16   Ht 5' 10" (1.778 m)   Wt 64.1 kg (141 lb 6.4 oz)   SpO2 97%   BMI 20.29 kg/m²         Physical Exam  Constitutional:       General: He is not in acute distress.     Appearance: Normal appearance. He is well-developed. He is not ill-appearing or toxic-appearing.   HENT:      Head: Normocephalic and atraumatic.      Nose: Nose normal.      Mouth/Throat:      Mouth: Mucous membranes are moist.      Pharynx: Oropharynx is clear. No oropharyngeal exudate or posterior oropharyngeal erythema.   Eyes:      General: Lids are normal. No scleral icterus.        Right eye: No discharge.         Left eye: No discharge.      Conjunctiva/sclera: Conjunctivae normal.   Cardiovascular:      Rate and Rhythm: Normal rate and regular rhythm.      Pulses:           Radial pulses are 2+ on the right side and 2+ on the left side.   Pulmonary:      Effort: Pulmonary effort is normal. No respiratory distress.      Breath sounds: No stridor. No wheezing.   Abdominal:      General: Bowel sounds are normal. There is distension (mildly with air).      Palpations: Abdomen is soft. There is no fluid wave, hepatomegaly, splenomegaly or mass.     "  Tenderness: There is no abdominal tenderness. There is no guarding or rebound.   Musculoskeletal:      Cervical back: Full passive range of motion without pain.   Lymphadenopathy:      Cervical: No cervical adenopathy.   Skin:     General: Skin is warm and dry.      Capillary Refill: Capillary refill takes less than 2 seconds.      Coloration: Skin is not cyanotic, jaundiced or pale.   Neurological:      General: No focal deficit present.      Mental Status: He is alert and oriented to person, place, and time.   Psychiatric:         Mood and Affect: Mood normal.         Behavior: Behavior is cooperative.            All of the data above and below has been reviewed by myself and any further interpretations will be reflected in the assessment and plan.   The data includes review of external notes, and independent interpretation of lab results, procedures, x-rays, and imaging reports.      Assessment:  Diarrhea, unspecified type  -     Calprotectin, Stool; Future; Expected date: 08/20/2024  -     Endomysial antibody, IgA titer; Future; Expected date: 08/20/2024  -     Fecal fat, qualitative; Future; Expected date: 08/20/2024  -     Gliadin (Deamidated) Ab (IgG); Future; Expected date: 08/20/2024  -     Gliadin (Deamidated) Ab IgA; Future; Expected date: 08/20/2024  -     Pancreatic elastase, fecal; Future; Expected date: 08/20/2024  -     Tissue Transglutaminase, IgA; Future; Expected date: 08/20/2024  -     IgA; Future; Expected date: 08/20/2024  -     TSH w/reflex to FT4; Future; Expected date: 08/20/2024  -     Comprehensive Metabolic Panel; Future; Expected date: 08/20/2024  -     CBC Auto Differential; Future; Expected date: 08/20/2024    Calculus of gallbladder without cholecystitis without obstruction    Bloating  -     Calprotectin, Stool; Future; Expected date: 08/20/2024  -     Endomysial antibody, IgA titer; Future; Expected date: 08/20/2024  -     Fecal fat, qualitative; Future; Expected date:  08/20/2024  -     Gliadin (Deamidated) Ab (IgG); Future; Expected date: 08/20/2024  -     Gliadin (Deamidated) Ab IgA; Future; Expected date: 08/20/2024  -     Pancreatic elastase, fecal; Future; Expected date: 08/20/2024  -     Tissue Transglutaminase, IgA; Future; Expected date: 08/20/2024  -     IgA; Future; Expected date: 08/20/2024    Right upper quadrant pain    Abdominal pain, epigastric    Abdominal cramping    History of colon polyps    Family history of colon cancer in father    Colon due 2026.  Unclear if episodes of abd pain is related to GB. Will plan for stool/blood tests to rule out EPI and celiac.     Recommendations:  Complete blood and stool tests.     Risks, benefits, and alternatives of medical management, any associated procedures, and/or treatment discussed with the patient. Patient given opportunity to ask questions and voices understanding. Patient has elected to proceed with the recommended care modalities as discussed.    Instructed patient to notify my office if they have not been contacted within two weeks after any procedures, submitting any samples (biopsies, blood, stool, urine, etc.) or after any imaging (X-ray, CT, MRI, etc.).     Follow up in about 1 year (around 8/20/2025).with NP    Order summary:  Orders Placed This Encounter   Procedures    Calprotectin, Stool    Endomysial antibody, IgA titer    Fecal fat, qualitative    Gliadin (Deamidated) Ab (IgG)    Gliadin (Deamidated) Ab IgA    Pancreatic elastase, fecal    Tissue Transglutaminase, IgA    IgA    TSH w/reflex to FT4    Comprehensive Metabolic Panel    CBC Auto Differential      This assessment, plan, and documentation was performed in collaboration with Mary Lorenz NP.     This document may have been created using a voice recognition transcribing system. Incorrect words or phrases may have been missed during proofreading. Please interpret accordingly or contact me for clarification.     Anitha Carter MD

## 2024-08-22 LAB
ABS NRBC COUNT: 0 X 10 3/UL (ref 0–0.01)
ABSOLUTE BASOPHIL: 0.06 X 10 3/UL (ref 0–0.22)
ABSOLUTE EOSINOPHIL: 0.1 X 10 3/UL (ref 0.04–0.54)
ABSOLUTE IMMATURE GRAN: 0.01 X 10 3/UL (ref 0–0.04)
ABSOLUTE LYMPHOCYTE: 1.39 X 10 3/UL (ref 0.86–4.75)
ABSOLUTE MONOCYTE: 0.49 X 10 3/UL (ref 0.22–1.08)
ALBUMIN SERPL-MCNC: 4.4 G/DL (ref 3.5–5.2)
ALBUMIN/GLOB SERPL ELPH: 1.4 {RATIO} (ref 1–2.7)
ALP ISOS SERPL LEV INH-CCNC: 73 U/L (ref 40–130)
ALT (SGPT): 11 U/L (ref 0–41)
ANION GAP SERPL CALC-SCNC: 10 MMOL/L (ref 8–17)
AST SERPL-CCNC: 15 U/L (ref 0–40)
BASOPHILS NFR BLD: 1.3 % (ref 0.2–1.2)
BILIRUBIN, TOTAL: 0.27 MG/DL (ref 0–1.2)
BUN/CREAT SERPL: 13.8 (ref 6–20)
CALCIUM SERPL-MCNC: 9.5 MG/DL (ref 8.6–10.2)
CARBON DIOXIDE, CO2: 28 MMOL/L (ref 22–29)
CHLORIDE: 103 MMOL/L (ref 98–107)
CREAT SERPL-MCNC: 0.93 MG/DL (ref 0.7–1.2)
EOSINOPHIL NFR BLD: 2.1 % (ref 0.7–7)
GFR ESTIMATION: 104.49 ML/MIN/1.73M2
GLIADIN AB IGA, DEAMINATED: 0.9 U/ML
GLIADIN AB IGG, DEAMINATED: <0.6 U/ML
GLOBULIN: 3.1 G/DL (ref 1.5–4.5)
GLUCOSE: 104 MG/DL (ref 74–106)
HCT VFR BLD AUTO: 40.7 % (ref 42–52)
HGB BLD-MCNC: 13.4 G/DL (ref 14–18)
IGA SERPL-MCNC: 249 MG/DL (ref 70–400)
IMMATURE GRANULOCYTES: 0.2 % (ref 0–0.5)
LYMPHOCYTES NFR BLD: 29 % (ref 19.3–53.1)
MCH RBC QN AUTO: 30.3 PG (ref 27–32)
MCHC RBC AUTO-ENTMCNC: 32.9 G/DL (ref 32–36)
MCV RBC AUTO: 92.1 FL (ref 80–94)
MONOCYTES NFR BLD: 10.2 % (ref 4.7–12.5)
NEUTROPHILS # BLD AUTO: 2.75 X 10 3/UL (ref 2.15–7.56)
NEUTROPHILS NFR BLD: 57.2 % (ref 34–71.1)
NUCLEATED RED BLOOD CELLS: 0 /100 WBC (ref 0–0.2)
PLATELET # BLD AUTO: 276 X 10 3/UL (ref 135–400)
POTASSIUM: 5 MMOL/L (ref 3.5–5.1)
PROT SNV-MCNC: 7.5 G/DL (ref 6.4–8.3)
RBC # BLD AUTO: 4.42 X 10 6/UL (ref 4.7–6.1)
RDW-SD: 44.8 FL (ref 37–54)
SODIUM: 141 MMOL/L (ref 136–145)
TSH W/REFLEX TO FT4: 2.54 UIU/ML (ref 0.27–4.2)
TTG IGA: 0.5 U/ML
UREA NITROGEN (BUN): 12.8 MG/DL (ref 6–20)
WBC # BLD: 4.8 X 10 3/UL (ref 4.3–10.8)

## 2024-09-07 ENCOUNTER — TELEPHONE (OUTPATIENT)
Dept: GASTROENTEROLOGY | Facility: CLINIC | Age: 44
End: 2024-09-07

## 2024-09-07 NOTE — TELEPHONE ENCOUNTER
Hb 13.4L, MCV 92.1, CBC onl, Celiac/TSH/CMP nl.    Notify patient that his blood results are overall good.  His thyroid, kidneys and liver are normal.  He is mildly anemic as he was in 2022 and that is stable. I do not have his stool results and I assume he has not submitted those stool samples. He should be sure to hear from us with those results within two weeks of submitting the samples.  Also, I recommend an anemia work up with his lab draw.  [facility] and he should notify me sooner if any issues or if he would like me to order the anemia work up.  NBP

## 2024-09-09 NOTE — TELEPHONE ENCOUNTER
Called and left patient a detailed message to return my call , will set reminder to try back again in the am .  [facility]

## 2024-09-10 NOTE — TELEPHONE ENCOUNTER
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

## 2024-09-16 ENCOUNTER — TELEPHONE (OUTPATIENT)
Dept: GASTROENTEROLOGY | Facility: CLINIC | Age: 44
End: 2024-09-16
Payer: COMMERCIAL

## 2024-09-16 NOTE — TELEPHONE ENCOUNTER
Stool test results pulled from [facility] and scanned into patient's chart. Routed to NBP. Not sure how I created a duplicate. DMP

## 2024-09-17 NOTE — TELEPHONE ENCOUNTER
Elast/fat/calpro nl.    Notify patient that his stool results were normal including no signs of colitis or low pancreatic enzymes.  He should notify me with any issues and confirm I get his anemia work up results once completed.  NBP

## 2024-09-19 NOTE — TELEPHONE ENCOUNTER
Called and left patient a detailed message to return my call , will set reminder to try back again in the AM.  [facility]

## 2024-09-20 NOTE — TELEPHONE ENCOUNTER
Called and left patient a message to return my call to go over results , will set reminder for Monday   [facility].

## 2024-09-20 NOTE — TELEPHONE ENCOUNTER
Message  Received: Today   Pt Advice  Sharyn Lua Staff  Caller: self (Today,  3:02 PM)  Type:  Patient Returning Call    Who Called:Jerome Loja  Who Left Message for Patient:Angel  Does the patient know what this is regarding?:results  Would the patient rather a call back or a response via [facility]? Call back  Best Call Back Number:[phone]  Additional Information: n/a

## 2025-08-20 ENCOUNTER — TELEPHONE (OUTPATIENT)
Dept: GASTROENTEROLOGY | Facility: CLINIC | Age: 45
End: 2025-08-20

## 2025-08-20 ENCOUNTER — OFFICE VISIT (OUTPATIENT)
Dept: GASTROENTEROLOGY | Facility: CLINIC | Age: 45
End: 2025-08-20
Payer: COMMERCIAL

## 2025-08-20 VITALS
HEART RATE: 74 BPM | SYSTOLIC BLOOD PRESSURE: 115 MMHG | BODY MASS INDEX: 21.3 KG/M2 | DIASTOLIC BLOOD PRESSURE: 76 MMHG | OXYGEN SATURATION: 97 % | WEIGHT: 148.81 LBS | HEIGHT: 70 IN

## 2025-08-20 DIAGNOSIS — K80.20 CALCULUS OF GALLBLADDER WITHOUT CHOLECYSTITIS WITHOUT OBSTRUCTION: ICD-10-CM

## 2025-08-20 DIAGNOSIS — Z80.0 FAMILY HISTORY OF COLON CANCER IN FATHER: ICD-10-CM

## 2025-08-20 DIAGNOSIS — D64.9 ANEMIA, UNSPECIFIED TYPE: Primary | ICD-10-CM

## 2025-08-20 DIAGNOSIS — Z86.0100 HISTORY OF COLON POLYPS: ICD-10-CM

## 2025-08-20 PROCEDURE — 99214 OFFICE O/P EST MOD 30 MIN: CPT | Mod: S$PBB,,, | Performed by: NURSE PRACTITIONER

## 2025-08-21 LAB
%TRANSFERRIN SATURATION: 28.9 % (ref 20–50)
ABS NRBC COUNT: 0 X 10 3/UL (ref 0–0.01)
ABSOLUTE BASOPHIL: 0.08 X 10 3/UL (ref 0–0.22)
ABSOLUTE EOSINOPHIL: 0.13 X 10 3/UL (ref 0.04–0.54)
ABSOLUTE IMMATURE GRAN: 0.02 X 10 3/UL (ref 0–0.04)
ABSOLUTE LYMPHOCYTE: 1.56 X 10 3/UL (ref 0.86–4.75)
ABSOLUTE MONOCYTE: 0.57 X 10 3/UL (ref 0.22–1.08)
ABSOLUTE RETIC: 0.07 X 10 6/UL (ref 0.03–0.1)
ADDITIONAL TESTING: NORMAL
B12: 385 PG/ML (ref 232–1245)
BASOPHILS NFR BLD: 1.6 % (ref 0.2–1.2)
EOSINOPHIL NFR BLD: 2.6 % (ref 0.7–7)
FERRITIN: 155 NG/ML (ref 20–380)
FOLATE SERPL-MCNC: 5.98 NG/ML (ref 4.4–31)
HCT VFR BLD AUTO: 41.2 % (ref 42–52)
HEMOGLOBIN A1: NORMAL
HEMOGLOBIN A2: NORMAL
HEMOGLOBIN C: NORMAL
HEMOGLOBIN F: NORMAL
HEMOGLOBIN S: NORMAL
HGB BLD-MCNC: 13.4 G/DL (ref 14–18)
HGB FRACT BLD-IMP: NORMAL
IMMATURE GRANULOCYTES: 0.4 % (ref 0–0.5)
IRON BINDING CAPACITY: 311 UG/DL (ref 262–472)
IRON SERPL-MCNC: 90 UG/DL (ref 59–158)
LDH SERPL L TO P-CCNC: 145 U/L (ref 135–225)
LYMPHOCYTES NFR BLD: 31.1 % (ref 19.3–53.1)
MCH RBC QN AUTO: 30.9 PG (ref 27–32)
MCHC RBC AUTO-ENTMCNC: 32.5 G/DL (ref 32–36)
MCV RBC AUTO: 95.2 FL (ref 80–94)
MONOCYTES NFR BLD: 11.4 % (ref 4.7–12.5)
NEUTROPHILS # BLD AUTO: 2.66 X 10 3/UL (ref 2.15–7.56)
NEUTROPHILS NFR BLD: 52.9 % (ref 34–71.1)
NUCLEATED RED BLOOD CELLS: 0 /100 WBC (ref 0–0.2)
PLATELET # BLD AUTO: 286 X 10 3/UL (ref 135–400)
RBC # BLD AUTO: 4.33 X 10 6/UL (ref 4.7–6.1)
RDW-SD: 46.5 FL (ref 37–54)
RETICULOCYTE COUNT: 1.6 % (ref 0.5–1.8)
UIBC SERPL-MCNC: 221 UG/DL (ref 112–346)
WBC # BLD: 5.02 X 10 3/UL (ref 4.3–10.8)

## (undated) DEVICE — GLOVE PROTEXIS LTX MICRO  7

## (undated) DEVICE — PACKING NASOPORE NASAL STD 8CM

## (undated) DEVICE — BLANKET SNUGGLE WARM LOWER BDY

## (undated) DEVICE — SOL NACL IRR 1000ML BTL

## (undated) DEVICE — SOL NORMAL USPCA 0.9%

## (undated) DEVICE — GLOVE PROTEXIS BLUE LATEX 7

## (undated) DEVICE — GLOVE PROTEXIS LTX MICRO 6.5

## (undated) DEVICE — TUBE SUCTION MEDI-VAC STERILE

## (undated) DEVICE — KIT ANTIFOG W/SPONG & FLUID

## (undated) DEVICE — NDL SPINAL 22GA 3.5 IN QUINCKE

## (undated) DEVICE — SET TUBE (1) SMARTSITE PRT 104

## (undated) DEVICE — SUPPORT ULNA NERVE PROTECTOR

## (undated) DEVICE — SEE MEDLINE ITEM 157059